# Patient Record
Sex: FEMALE | Race: BLACK OR AFRICAN AMERICAN | Employment: FULL TIME | URBAN - METROPOLITAN AREA
[De-identification: names, ages, dates, MRNs, and addresses within clinical notes are randomized per-mention and may not be internally consistent; named-entity substitution may affect disease eponyms.]

---

## 2018-09-09 RX ORDER — DEXTROMETHORPHAN/PSEUDOEPHED 2.5-7.5/.8
1.2 DROPS ORAL
Status: CANCELLED | OUTPATIENT
Start: 2018-09-09

## 2018-09-09 RX ORDER — ATROPINE SULFATE 0.1 MG/ML
0.5 INJECTION INTRAVENOUS
Status: CANCELLED | OUTPATIENT
Start: 2018-09-09 | End: 2018-09-10

## 2018-09-09 RX ORDER — EPINEPHRINE 0.1 MG/ML
1 INJECTION INTRACARDIAC; INTRAVENOUS
Status: CANCELLED | OUTPATIENT
Start: 2018-09-09 | End: 2018-09-10

## 2018-09-12 ENCOUNTER — HOSPITAL ENCOUNTER (OUTPATIENT)
Age: 43
Setting detail: OUTPATIENT SURGERY
Discharge: HOME | End: 2018-09-12
Attending: INTERNAL MEDICINE | Admitting: INTERNAL MEDICINE
Payer: COMMERCIAL

## 2018-09-12 VITALS
RESPIRATION RATE: 16 BRPM | HEART RATE: 61 BPM | DIASTOLIC BLOOD PRESSURE: 75 MMHG | OXYGEN SATURATION: 100 % | HEIGHT: 62 IN | WEIGHT: 184.7 LBS | BODY MASS INDEX: 33.99 KG/M2 | SYSTOLIC BLOOD PRESSURE: 120 MMHG | TEMPERATURE: 96.9 F

## 2018-09-12 LAB — HCG UR QL: NEGATIVE

## 2018-09-12 PROCEDURE — 77030032490 HC SLV COMPR SCD KNE COVD -B: Performed by: INTERNAL MEDICINE

## 2018-09-12 PROCEDURE — 74011250636 HC RX REV CODE- 250/636: Performed by: INTERNAL MEDICINE

## 2018-09-12 PROCEDURE — G0500 MOD SEDAT ENDO SERVICE >5YRS: HCPCS | Performed by: INTERNAL MEDICINE

## 2018-09-12 PROCEDURE — 74011000250 HC RX REV CODE- 250: Performed by: INTERNAL MEDICINE

## 2018-09-12 PROCEDURE — 77030038020 HC MANFLD NEPTUNE STRY -B: Performed by: INTERNAL MEDICINE

## 2018-09-12 PROCEDURE — 81025 URINE PREGNANCY TEST: CPT

## 2018-09-12 PROCEDURE — 74011250636 HC RX REV CODE- 250/636

## 2018-09-12 PROCEDURE — 76040000007: Performed by: INTERNAL MEDICINE

## 2018-09-12 RX ORDER — NALOXONE HYDROCHLORIDE 0.4 MG/ML
0.4 INJECTION, SOLUTION INTRAMUSCULAR; INTRAVENOUS; SUBCUTANEOUS
Status: DISCONTINUED | OUTPATIENT
Start: 2018-09-12 | End: 2018-09-12 | Stop reason: HOSPADM

## 2018-09-12 RX ORDER — FLUMAZENIL 0.1 MG/ML
0.2 INJECTION INTRAVENOUS
Status: DISCONTINUED | OUTPATIENT
Start: 2018-09-12 | End: 2018-09-12 | Stop reason: HOSPADM

## 2018-09-12 RX ORDER — FENTANYL CITRATE 50 UG/ML
100 INJECTION, SOLUTION INTRAMUSCULAR; INTRAVENOUS
Status: DISCONTINUED | OUTPATIENT
Start: 2018-09-12 | End: 2018-09-12 | Stop reason: HOSPADM

## 2018-09-12 RX ORDER — DICLOFENAC SODIUM 25 MG/1
TABLET, DELAYED RELEASE ORAL 2 TIMES DAILY
COMMUNITY

## 2018-09-12 RX ORDER — GLUCOSAMINE SULFATE 1500 MG
POWDER IN PACKET (EA) ORAL DAILY
COMMUNITY

## 2018-09-12 RX ORDER — MIDAZOLAM HYDROCHLORIDE 1 MG/ML
.5-5 INJECTION, SOLUTION INTRAMUSCULAR; INTRAVENOUS
Status: DISCONTINUED | OUTPATIENT
Start: 2018-09-12 | End: 2018-09-12 | Stop reason: HOSPADM

## 2018-09-12 RX ORDER — HYDROCHLOROTHIAZIDE 25 MG/1
25 TABLET ORAL DAILY
COMMUNITY

## 2018-09-12 RX ORDER — SODIUM CHLORIDE 9 MG/ML
100 INJECTION, SOLUTION INTRAVENOUS CONTINUOUS
Status: DISCONTINUED | OUTPATIENT
Start: 2018-09-12 | End: 2018-09-12 | Stop reason: HOSPADM

## 2018-09-12 RX ADMIN — SODIUM CHLORIDE 100 ML/HR: 900 INJECTION, SOLUTION INTRAVENOUS at 11:19

## 2018-09-12 NOTE — H&P
This 39year old female presents for GERD and Abdominal Pain. History of Present Illness: 
1. GERD The onset of the heartburn was 20 years ago. The severity is 1-7. The problem is not changing. The patient reports epigastric pain and heartburn. It occurs randomly. Context: family history of colon cancer. The symptoms are relieved by Mylanta. Associated symptoms include reflux. Pertinent negatives include chronic cough, dysphagia, melena, nausea, sore throat, vomiting and weight loss. Additional information: Gastric Emptying study done 5 years ago, results showed slow digestion. Nexium for 4 to 5 years which helped some. She is on Diclofenac for hip pain. 2.  Abdominal Pain Onset: 1 year ago. Severity is 7. Duration is varies. It occurs weekly. The problem is unchanged. Location is epigastric, LUQ. The patient describes it as bloating and tender. Context: no pattern noted. Symptom is aggravated by constipation and food. Relieving factors include vomiting. Additional information: BM 1 to 2/day, no blood incomplete evacuation. Colonoscopy 10 years ago by Dr. Cristo Hanks, normal.  MGF-Colon & lung cancer. No ETOH or tobacco use. PROBLEM LIST:   Problem List reviewed. PAST MEDICAL/SURGICAL HISTORY   (Detailed) Disease/disorder Onset Date Management Date Comments Contraception  Lap BTL 2016 [facility] [place] 10/18/2017 - Abnl Pap Smear  LEEP 2000 Pregnancy   x 4  [facility] [place] 10/18/2017 - Tonsillectomy  OBSTETRIC HISTORY: 
Not currently pregnant. Family History  (Detailed) Relationship Family Member Name  Age at Death Condition Onset Age Cause of Death Father    Cancer, lung  N Mother    Diabetes mellitus  N Mother    Hypertension  N Sister    Seizure disorder  N Social History:  (Detailed) Tobacco use reviewed. Preferred language is [place]. The patient does not need an . Smoking status: Never smoker. SMOKING STATUS Use Status Type Smoking Status Usage Per Day Years Used Total Pack Years  
no/never  Never smoker ALCOHOL There is no history of alcohol use. CAFFEINE The patient does not use caffeine. Medications (active prior to today) Medication Instructions Start Date Stop Date Refilled Elsewhere  
diclofenac sodium 75 mg tablet,delayed release take 1 tablet by oral route 2 times every day 11/08/2017   N Vitamin D3 1,000 unit capsule take 1 tablet by oral route  every day 07/26/2018   N  
hydrochlorothiazide 25 mg tablet take 1 tablet by oral route  every day 07/26/2018   N Patient Status Completed with information received for patient in a summary of care record. Medication Reconciliation Medications reconciled today. Medication Reviewed Adherence Medication Name Sig Desc Elsewhere Status  
taking as directed diclofenac sodium 75 mg tablet,delayed release take 1 tablet by oral route 2 times every day N Verified  
taking as directed Vitamin D3 1,000 unit capsule take 1 tablet by oral route  every day N Verified  
taking as directed hydrochlorothiazide 25 mg tablet take 1 tablet by oral route  every day N Verified Medications (Added, Continued or Stopped today) Start Date Medication Directions PRN Status PRN Reason Instruction Stop Date 11/08/2017 diclofenac sodium 75 mg tablet,delayed release take 1 tablet by oral route 2 times every day N     
07/26/2018 hydrochlorothiazide 25 mg tablet take 1 tablet by oral route  every day N     
07/26/2018 Vitamin D3 1,000 unit capsule take 1 tablet by oral route  every day N Allergies: 
Ingredient Reaction (Severity) Medication Name Comment MEPERIDINE HCL GI problems DEMEROL   
PENICILLINS PRESERVATIVE FREE GI problems DEMEROL Reviewed, no changes. Review of Systems System Neg/Pos Details Constitutional Negative Chills, Fever, Malaise and Weight loss. ENMT Negative Nasal congestion and Sore throat. Eyes Negative Double vision. Respiratory Negative Asthma, Chronic cough and Wheezing. Cardio Negative Chest pain, Edema and Irregular heartbeat/palpitations. GI Positive Abdominal pain, Change in bowel habits, Reflux. GI Negative Constipation, Decreased appetite, Diarrhea, Dysphagia, Heartburn, Hematemesis, Hematochezia, Melena, Nausea and Vomiting.  Negative Dysuria and Hematuria. Endocrine Negative Cold intolerance, Heat intolerance and Increased thirst.  
Neuro Negative Dizziness, Headache, Numbness, Tremors and Vertigo. Psych Negative Anxiety, Depression and Increased stress. Integumentary Negative Hives and Rash. MS Positive Joint pain. MS Negative Back pain and Myalgia. Hema/Lymph Negative Easy bleeding and Easy bruising. Allergic/Immuno Negative Contact allergy, Food allergies and Seasonal allergies. Vital Signs Height Time ft in cm Last Measured Height Position 3:41 PM 5.0 2.00 157.48 09/04/2018 0 Weight/BSA/BMI Time lb oz kg Context BMI kg/m2 BSA m2  
3:41 .00  82.100 dressed with shoes 33.10 Date/Time Temp Pulse BP MAP (Calculated) Arterial Line 1 BP (mmHg) BP Patient Position Resp SpO2 O2 Device O2 Flow Rate (L/min) Pre/Post Ductal Weight   
  09/12/18 1102 96.2 °F (35.7 °C) 70 133/82 99 -- -- 18 99 % Room air -- -- 83.8 kg (184 lb 11.2 oz Measured By Time Measured by  
3:41 PM [facility] 3:32 PM [facility] PHYSICAL EXAM: 
Exam Findings Details Constitutional Normal No acute distress. Well Nourished. Well developed.   
Eyes Normal General - Right: Normal, Left: Normal. Conjunctiva - Right: Normal, Left: Normal. Sclera - Right: Normal, Left: Normal. Pupil - Right: Normal, Left: Normal.  
Nose/Mouth/Throat Normal Lips/teeth/gums - Normal. Tongue - Normal. Buccal mucosa - Normal. Palate & uvula - Normal.  
Neck Exam Normal Inspection - Normal. Palpation - Normal. Thyroid gland - Normal. Submandibular lymph nodes - Normal.  
 Lymph Detail Normal Submandibular. Anterior cervical. Posterior cervical. Supraclavicular. Respiratory Normal Inspection - Normal. Auscultation - Normal. Percussion - Normal. Cough - Absent. Effort - Normal.  
Cardiovascular Normal Heart rate - Regular rate. Heart sounds - Normal S1, Normal S2. Murmurs - None. Extremities - No edema. Abdomen * Abdominal tenderness - very mild diffuse epigastric. Abdomen Normal Patient is not obese. Inspection - Normal. Appliance(s) - None. Abdominal muscles - Normal. Auscultation - Normal. Percussion - Normal. Anterior palpation - No guarding, No rebound. CVA tenderness - None. Umbilicus - Normal. No hepatic enlargement. No spleen enlargement. No hernia. No ascites. No palpable mass. Horan's sign - Normal.  
Skin Normal Inspection - Normal.  
Musculoskeletal Normal Hands - Left: Normal, Right: Normal.  
Extremity Normal No cyanosis. No edema. Clubbing - Absent. Neurological Normal Level of consciousness - Normal. Orientation - Normal. Memory - Normal. Motor - Normal. Balance & gait - Normal. Coordination - Normal. Fine motor skills - Normal. DTRs - Normal.  
Psychiatric Normal Orientation - Oriented to time, place, person & situation. Not anxious. Appropriate mood and affect. Behavior appropriate for age. Sufficient fund of knowledge. Sufficient language. No memory loss. Assessment/Plan # Detail Type Description 1. Assessment GERD without esophagitis (K21.9). Patient Plan chronic GERD x 20 years used to take Nexium stopped 4 to 5 years. She got better after losing some weight She was found to have gastroparesis in 2013 treated with Domperidone for less than a year. It didn't help much. . She is borderline diabetic. Now she is getting occasional heartburns on occasions at night wakes her up at night where she vomits every couple of months. She takes OTC MYlanta She can vomit after eating 4 to 5 times in the last year she vomits what ever she ate. It makes her feel better. Intermittent mild to severe epigastric pain 7/10 has to lay down. no radiation to the back. It lasts  few hours. Not at night. Not related to food No smoking or drinking No belching, dysphagia, no early satiety but has fullness after eating. No regurgitation only if she gets nausea once in a blue moon She has one to 2 bm / day sometimes she is constipated with incomplete evacuation. No blood. Sometimes she gets upper abdominal bloating. Not relieved by belching. last colonoscopy was in [place] 10 years ago was negative. MGF had colon cancer. Father had lung cancer. I don't think now she has gastroparesis. because no belching, nausea or frequent vomiting. However she does c/o post prandial fullness For now I need to check her stomach from inside. if needed we can do gastric empty study and US of the abdomen. I explained to her the procedure of upper endoscopy or EGD, the alternative and the risks involved which include but not limited to aspiration, bleeding perforation or reaction to sedation. She was agreeable to this. 2. Assessment Gastroparesis (K31.84). Patient Plan cf above. She has been c/o mild constipation where sometimes she has an incomplete evacuation. I told her she to treat it. I advised her to consume more water, cooked vegetables and fresh fruits and vegetables. Should not hesitate to Take Miralax as needed or on regular basis once or more to control their symptoms but sometimes have to add another laxative or take even an enema if the above do not work CBC, CMP, Vit B12 normal glucose 140 Hgb A1c 6.1 Vit D 15. she needs to take Vit D daily.  Next colonoscopy would be at age 46 yo

## 2018-09-12 NOTE — DISCHARGE INSTRUCTIONS
Tashi Sumner  [id number]  1975    EGD DISCHARGE INSTRUCTIONS  Discomfort:  Sore throat- throat lozenges or warm salt water gargle  redness at IV site- apply warm compress to area; if redness or soreness persist- contact your physician  Gaseous discomfort- walking, belching will help relieve any discomfort  You may not operate a vehicle until the next day  You may not engage in an occupation involving machinery or appliances until the next day  You may not drink alcoholic beverages until the next day  Avoid making any critical decisions for at least 24 hour    DIET   You may not resume your regular diet. Antireflux diet. ACTIVITY  You may not resume your normal daily activities   Spend the remainder of the day resting -  avoid any strenuous activity. CALL M.D. ANY SIGN OF   Increasing pain, nausea, vomiting  Abdominal distension (swelling)  New increased bleeding (oral or rectal)  Fever (chills)  Pain in chest area  Bloody discharge from nose or mouth  Shortness of breath     You may take any Advil, Aspirin, Ibuprofen, Motrin, Aleve, or Goodys but preferably Tylenol as needed for pain. Follow-up Instructions: Follow-up in the office as scheduled or make a follow-up appointment in 2 weeks. Maribel Pierce MD  September 12, 2018   Patient armband removed and shredded    DISCHARGE SUMMARY from Nurse    PATIENT INSTRUCTIONS:    After general anesthesia or intravenous sedation, for 24 hours or while taking prescription Narcotics:  · Limit your activities  · Do not drive and operate hazardous machinery  · Do not make important personal or business decisions  · Do  not drink alcoholic beverages  · If you have not urinated within 8 hours after discharge, please contact your surgeon on call.     Report the following to your surgeon:  · Excessive pain, swelling, redness or odor of or around the surgical area  · Temperature over 100.5  · Nausea and vomiting lasting longer than 4 hours or if unable to take medications  · Any signs of decreased circulation or nerve impairment to extremity: change in color, persistent  numbness, tingling, coldness or increase pain  · Any questions    What to do at Home:  Recommended activity: Activity as tolerated and no driving for today,     If you experience any of the following symptoms as above, please follow up with dr. Kae Ramos. *  Please give a list of your current medications to your Primary Care Provider. *  Please update this list whenever your medications are discontinued, doses are      changed, or new medications (including over-the-counter products) are added. *  Please carry medication information at all times in case of emergency situations. These are general instructions for a healthy lifestyle:    No smoking/ No tobacco products/ Avoid exposure to second hand smoke  Surgeon General's Warning:  Quitting smoking now greatly reduces serious risk to your health. Obesity, smoking, and sedentary lifestyle greatly increases your risk for illness    A healthy diet, regular physical exercise & weight monitoring are important for maintaining a healthy lifestyle    You may be retaining fluid if you have a history of heart failure or if you experience any of the following symptoms:  Weight gain of 3 pounds or more overnight or 5 pounds in a week, increased swelling in our hands or feet or shortness of breath while lying flat in bed. Please call your doctor as soon as you notice any of these symptoms; do not wait until your next office visit. Recognize signs and symptoms of STROKE:    F-face looks uneven    A-arms unable to move or move unevenly    S-speech slurred or non-existent    T-time-call 911 as soon as signs and symptoms begin-DO NOT go       Back to bed or wait to see if you get better-TIME IS BRAIN. Warning Signs of HEART ATTACK     Call 911 if you have these symptoms:   Chest discomfort.  Most heart attacks involve discomfort in the center of the chest that lasts more than a few minutes, or that goes away and comes back. It can feel like uncomfortable pressure, squeezing, fullness, or pain.  Discomfort in other areas of the upper body. Symptoms can include pain or discomfort in one or both arms, the back, neck, jaw, or stomach.  Shortness of breath with or without chest discomfort.  Other signs may include breaking out in a cold sweat, nausea, or lightheadedness. Don't wait more than five minutes to call 911 - MINUTES MATTER! Fast action can save your life. Calling 911 is almost always the fastest way to get lifesaving treatment. Emergency Medical Services staff can begin treatment when they arrive -- up to an hour sooner than if someone gets to the hospital by car. The discharge information has been reviewed with the patient and parent. The patient and parent verbalized understanding. Discharge medications reviewed with the patient and guardian and appropriate educational materials and side effects teaching were provided.   ___________________________________________________________________________________________________________________________________

## 2018-09-12 NOTE — PROCEDURES
[facility]  Esophagogastroduodenoscopy Procedure Report  _______________________________________________________  Patient: Yony Briscoe                                         Attending Physician: Shirley Velásquez MD    Patient ID: [id number]                                      Referring Physician: No primary care provider on file. Exam Date: September 12, 2018 _______________________________________________________      Introduction: A  39 y.o. female patient, presents for Esophagogastroduodenoscopy Procedure. Indication: chronic GERD x 20 years used to take Nexium but stopped 4 to 5 years ago. She got better after losing some weight. In 2013 she was found to have gastroparesis that was treated with Domperidone for less than a year. It didn't help much her symptoms. Now she is borderline diabetic. She has occasional heartburns rarely night waking her up from her sleep where she may vomit  every what ever she ate for supper this can happen once in a couple of months. It makes her feel better. She takes OTC MYlanta as needed. She also has Intermittent mild to severe epigastric pain up to 7/10 where she has to lay down. Pain does not radiate to the back. It lasts  few hours. Not at night and not related to food. She does not  smok or drink alcohol. She denied having belching, dysphagia, early satiety but does c/o fullness after eating heavier meals with bloating. No regurgitation only if she gets nausea once in a blue moon. She has one to 2 bm / day sometimes she is constipated with incomplete evacuation. No blood. last colonoscopy was in [place] 10 years ago was negative. MGF had colon cancer. Father had lung cancer. For the past year she has been taking Diclofenac 25 mg bid for bilateral hip bursitis    :  Shirley Velásquez MD    Sedation:    Versed 7 mg IV, fentanyl 100 mcg IV, topical Hurricaine spray,    Procedure Details:  After infomed consent was obtained for the procedure, with all risks and benefits of procedure explained the patient was taken to the endoscopy suite and placed in the left lateral decubitus position. Following sequential administration of sedation as per above, the endoscope was inserted into the mouth and advanced under direct vision to proximal jejunum. A careful inspection was made as the gastroscope was withdrawn, including a retroflexed view of the proximal stomach; findings and interventions are described below. Findings:  HYPOPHARYNX AND LARYNX: Normal  Esophagus: Normal proximal, middle and distal esophagus. 5 cm Hiatal hernia between 34 and 39 cm. Diaphragmatic opening is large located at 39 cm. Stomach: No food or liquid retention. Stomach not large with normal, cardia, fundus, body, lesser curvature, incisura, antrum and pylorus. Mucosa is normal.    Duodenum/jejunum: The bulb, second, third, fourth portions and major papilla are normal     Therapies:    none    Specimen:   none           Complications:   None    EBL:  None  IMPRESSION:  Large 5 cm hiatal hernia with large diaphragmatic opening otherwise normal exam.  Recommendations: -Acid suppression with a proton pump inhibitor such as Nexium 22 mg OTC as needed. , Avoid eating for 3 hours before bed time-GERD diet: avoid fried and fatty foods. peppermint, chocolate, alcohol, coffee, citrus fruits and juices, tomoato products; avoid lying down for 2 to 3 hours after eating. , -It is better to avoid NSAID'S. Consider the option of anti reflux surgery as alternative to medical therapy.  Take daily Vit D 2000 to 5000 units daily  Assistant: none    Horace Wayne MD  9/12/2018  9:09 AM

## 2018-09-12 NOTE — IP AVS SNAPSHOT
[address] [place] [address] 
[phone] Patient: Milena Concepcion 
MRN: [id number] DDQ:[id number] About your hospitalization You were admitted on:  September 12, 2018 You last received care in the:  [facility] ENDOSCOPY You were discharged on:  September 12, 2018 Why you were hospitalized Your primary diagnosis was:  Not on File Follow-up Information Follow up With Details Comments Contact Info Norma Dawson MD   [address] [place] [address] [address] [place] [address] 
[phone] Discharge Orders None A check dion indicates which time of day the medication should be taken. My Medications ASK your doctor about these medications Instructions Each Dose to Equal  
 Morning Noon Evening Bedtime  
 diclofenac EC 25 mg EC tablet Commonly known as:  VOLTAREN Your last dose was: Your next dose is: Take  by mouth two (2) times a day. hydroCHLOROthiazide 25 mg tablet Commonly known as:  HYDRODIURIL Your last dose was: Your next dose is: Take 25 mg by mouth daily. 25 mg  
    
   
   
   
  
 VITAMIN D3 1,000 unit Cap Generic drug:  cholecalciferol Your last dose was: Your next dose is: Take  by mouth daily. Discharge Instructions Milena Concepcion 
[id number] 
1975 EGD DISCHARGE INSTRUCTIONS Discomfort: 
Sore throat- throat lozenges or warm salt water gargle 
redness at IV site- apply warm compress to area; if redness or soreness persist- contact your physician Gaseous discomfort- walking, belching will help relieve any discomfort You may not operate a vehicle until the next day You may not engage in an occupation involving machinery or appliances until the next day You may not drink alcoholic beverages until the next day Avoid making any critical decisions for at least 24 hour DIET You may not resume your regular diet. Antireflux diet. ACTIVITY You may not resume your normal daily activities Spend the remainder of the day resting -  avoid any strenuous activity. CALL M.D. ANY SIGN OF Increasing pain, nausea, vomiting Abdominal distension (swelling) New increased bleeding (oral or rectal) Fever (chills) Pain in chest area Bloody discharge from nose or mouth Shortness of breath You may take any Advil, Aspirin, Ibuprofen, Motrin, Aleve, or Goodys but preferably Tylenol as needed for pain. Follow-up Instructions: Follow-up in the office as scheduled or make a follow-up appointment in 2 weeks. Ara Lopez MD 
September 12, 2018 Patient armband removed and shredded DISCHARGE SUMMARY from Nurse PATIENT INSTRUCTIONS: 
 
 
F-face looks uneven A-arms unable to move or move unevenly S-speech slurred or non-existent T-time-call 911 as soon as signs and symptoms begin-DO NOT go Back to bed or wait to see if you get better-TIME IS BRAIN. Warning Signs of HEART ATTACK Call 911 if you have these symptoms: 
? Chest discomfort. Most heart attacks involve discomfort in the center of the chest that lasts more than a few minutes, or that goes away and comes back. It can feel like uncomfortable pressure, squeezing, fullness, or pain. ? Discomfort in other areas of the upper body. Symptoms can include pain or discomfort in one or both arms, the back, neck, jaw, or stomach. ? Shortness of breath with or without chest discomfort. ? Other signs may include breaking out in a cold sweat, nausea, or lightheadedness. Don't wait more than five minutes to call [address]! Fast action can save your life. Calling 911 is almost always the fastest way to get lifesaving treatment. Emergency Medical Services staff can begin treatment when they arrive  up to an hour sooner than if someone gets to the hospital by car. The discharge information has been reviewed with the patient and parent. The patient and parent verbalized understanding. Discharge medications reviewed with the patient and guardian and appropriate educational materials and side effects teaching were provided. ___________________________________________________________________________________________________________________________________ Introducing [facility]! [facility] introduces Digital Loyalty System patient portal. Now you can access parts of your medical record, email your doctor's office, and request medication refills online. 1. In your internet browser, go to [email or web address] 2. Click on the First Time User? Click Here link in the Sign In box. You will see the New Member Sign Up page. 3. Enter your Digital Loyalty System Access Code exactly as it appears below. You will not need to use this code after youve completed the sign-up process. If you do not sign up before the expiration date, you must request a new code. · Digital Loyalty System Access Code: [email or web address] Expires: 12/11/2018 12:14 PM 
 
4. Enter the last four digits of your Social Security Number (xxxx) and Date of Birth (mm/dd/yyyy) as indicated and click Submit. You will be taken to the next sign-up page. 5. Create a Digital Loyalty System ID. This will be your Digital Loyalty System login ID and cannot be changed, so think of one that is secure and easy to remember. 6. Create a Digital Loyalty System password. You can change your password at any time. 7. Enter your Password Reset Question and Answer. This can be used at a later time if you forget your password. 8. Enter your e-mail address. You will receive e-mail notification when new information is available in [address] 9. Click Sign Up. You can now view and download portions of your medical record. 10. Click the Download Summary menu link to download a portable copy of your medical information. If you have questions, please visit the Frequently Asked Questions section of the Doujiaot website. Remember, Frontier pte is NOT to be used for urgent needs. For medical emergencies, dial 911. Now available from your iPhone and Android! Introducing Wan Lainez As a [employer] patient, I wanted to make you aware of our electronic visit tool called Wan Lainez. FFWD/7 allows you to connect within minutes with a medical provider 24 hours a day, seven days a week via a mobile device or tablet or logging into a secure website from your computer. You can access Wan Lainez from anywhere in the [place]. A virtual visit might be right for you when you have a simple condition and feel like you just dont want to get out of bed, or cant get away from work for an appointment, when your regular [facility] provider is not available (evenings, weekends or holidays), or when youre out of town and need minor care. Electronic visits cost only $49 and if the FFWD/ONEHOPE provider determines a prescription is needed to treat your condition, one can be electronically transmitted to a nearby pharmacy*. Please take a moment to enroll today if you have not already done so. The enrollment process is free and takes just a few minutes. To enroll, please download the Emb[employer] Distance 24/ONEHOPE sebastián to your tablet or phone, or visit [email or web address] to enroll on your computer.    
And, as an [address] [place] [address] patient with a [employer] account, the results of your visits will be scanned into your electronic medical record and your primary care provider will be able to view the scanned results. We urge you to continue to see your regular [facility] provider for your ongoing medical care. And while your primary care provider may not be the one available when you seek a Wan Xiangya Groupvíctorfin virtual visit, the peace of mind you get from getting a real diagnosis real time can be priceless. For more information on Agent Video Intelligence, view our Frequently Asked Questions (FAQs) at [email or web address]. Sincerely, 
 
Marixa Ang MD 
Chief Medical Officer [facility] Stephanie Card *:  certain medications cannot be prescribed via MyFitvíctorfin Providers Seen During Your Hospitalization Provider Specialty Primary office phone Camden Escalona MD Gastroenterology [phone] Your Primary Care Physician (PCP) Primary Care Physician Office Phone Office Fax Roseann Houston [phone] You are allergic to the following Allergen Reactions Demerol (Meperidine) Nausea and Vomiting Penicillins Hives Recent Documentation Height Weight Breastfeeding? BMI OB Status Smoking Status 1.575 m 83.8 kg No 33.78 kg/m2 Having regular periods Never Smoker Emergency Contacts Name Discharge Info Relation Home Work Mobile [facility] DISCHARGE CAREGIVER [3] Mother [14]   [phone] Patient Belongings The following personal items are in your possession at time of discharge: 
  Dental Appliances: None  Visual Aid: None Please provide this summary of care documentation to your next provider. Signatures-by signing, you are acknowledging that this After Visit Summary has been reviewed with you and you have received a copy. Patient Signature:  ____________________________________________________________ Date:  ____________________________________________________________  
  
[facility] Perfect  Provider Signature: ____________________________________________________________ Date:  ____________________________________________________________

## (undated) DEVICE — ENDO CARRY-ON PROCEDURE KIT INCLUDES ENZYMATIC SPONGE, GAUZE, BIOHAZARD LABEL, TRAY, LUBRICANT, DIRTY SCOPE LABEL, WATER LABEL, TRAY, DRAWSTRING PAD, AND DEFENDO 4-PIECE KIT.: Brand: ENDO CARRY-ON PROCEDURE KIT

## (undated) DEVICE — KENDALL SCD EXPRESS SLEEVES, KNEE LENGTH, MEDIUM: Brand: KENDALL SCD

## (undated) DEVICE — REM POLYHESIVE ADULT PATIENT RETURN ELECTRODE: Brand: VALLEYLAB

## (undated) DEVICE — TRAP SPEC COLL POLYP POLYSTYR --

## (undated) DEVICE — SINGLE PORT MANIFOLD: Brand: NEPTUNE 2

## (undated) DEVICE — MAJ-1414 SINGLE USE ADPATER BIOPSY VALV: Brand: SINGLE USE ADAPTOR BIOPSY VALVE

## (undated) DEVICE — KENDALL RADIOLUCENT FOAM MONITORING ELECTRODE RECTANGULAR SHAPE: Brand: KENDALL

## (undated) DEVICE — MOUTHPIECE ENDOSCP 20X27MM --

## (undated) DEVICE — MEDI-VAC NON-CONDUCTIVE SUCTION TUBING: Brand: CARDINAL HEALTH

## (undated) DEVICE — SPONGE GZ W4XL4IN COT 12 PLY TYP VII WVN C FLD DSGN